# Patient Record
Sex: MALE | Race: WHITE | URBAN - METROPOLITAN AREA
[De-identification: names, ages, dates, MRNs, and addresses within clinical notes are randomized per-mention and may not be internally consistent; named-entity substitution may affect disease eponyms.]

---

## 2018-03-26 ENCOUNTER — TRANSFERRED RECORDS (OUTPATIENT)
Dept: HEALTH INFORMATION MANAGEMENT | Facility: CLINIC | Age: 78
End: 2018-03-26

## 2018-04-12 ENCOUNTER — HOSPITAL ENCOUNTER (OUTPATIENT)
Dept: CARDIAC REHAB | Facility: CLINIC | Age: 78
Setting detail: THERAPIES SERIES
End: 2018-04-12
Attending: INTERNAL MEDICINE
Payer: MEDICARE

## 2018-04-12 PROCEDURE — 40000244 ZZH STATISTIC VISIT PULM REHAB: Performed by: CLINICAL EXERCISE PHYSIOLOGIST

## 2018-04-12 PROCEDURE — G0424 PULMONARY REHAB W EXER: HCPCS | Performed by: CLINICAL EXERCISE PHYSIOLOGIST

## 2018-04-17 ENCOUNTER — HOSPITAL ENCOUNTER (OUTPATIENT)
Dept: CARDIAC REHAB | Facility: CLINIC | Age: 78
End: 2018-04-17
Attending: INTERNAL MEDICINE
Payer: MEDICARE

## 2018-04-17 PROCEDURE — 40000244 ZZH STATISTIC VISIT PULM REHAB: Performed by: CLINICAL EXERCISE PHYSIOLOGIST

## 2018-04-17 PROCEDURE — G0424 PULMONARY REHAB W EXER: HCPCS | Performed by: CLINICAL EXERCISE PHYSIOLOGIST

## 2018-04-19 ENCOUNTER — HOSPITAL ENCOUNTER (OUTPATIENT)
Dept: CARDIAC REHAB | Facility: CLINIC | Age: 78
End: 2018-04-19
Attending: INTERNAL MEDICINE
Payer: MEDICARE

## 2018-04-19 PROCEDURE — G0424 PULMONARY REHAB W EXER: HCPCS

## 2018-04-19 PROCEDURE — 40000244 ZZH STATISTIC VISIT PULM REHAB

## 2018-04-24 ENCOUNTER — HOSPITAL ENCOUNTER (OUTPATIENT)
Dept: CARDIAC REHAB | Facility: CLINIC | Age: 78
End: 2018-04-24
Attending: INTERNAL MEDICINE
Payer: MEDICARE

## 2018-04-24 PROCEDURE — G0424 PULMONARY REHAB W EXER: HCPCS | Performed by: CLINICAL EXERCISE PHYSIOLOGIST

## 2018-04-24 PROCEDURE — 40000244 ZZH STATISTIC VISIT PULM REHAB: Performed by: CLINICAL EXERCISE PHYSIOLOGIST

## 2018-04-27 ENCOUNTER — HOSPITAL ENCOUNTER (OUTPATIENT)
Dept: CARDIAC REHAB | Facility: CLINIC | Age: 78
End: 2018-04-27
Attending: INTERNAL MEDICINE
Payer: MEDICARE

## 2018-04-27 PROCEDURE — 40000116 ZZH STATISTIC OP CR VISIT: Performed by: CLINICAL EXERCISE PHYSIOLOGIST

## 2018-04-27 PROCEDURE — 40000244 ZZH STATISTIC VISIT PULM REHAB: Performed by: CLINICAL EXERCISE PHYSIOLOGIST

## 2018-04-27 PROCEDURE — G0424 PULMONARY REHAB W EXER: HCPCS | Performed by: CLINICAL EXERCISE PHYSIOLOGIST

## 2018-04-27 PROCEDURE — 93798 PHYS/QHP OP CAR RHAB W/ECG: CPT | Performed by: CLINICAL EXERCISE PHYSIOLOGIST

## 2018-05-01 ENCOUNTER — HOSPITAL ENCOUNTER (OUTPATIENT)
Dept: CARDIAC REHAB | Facility: CLINIC | Age: 78
End: 2018-05-01
Attending: INTERNAL MEDICINE
Payer: MEDICARE

## 2018-05-01 PROCEDURE — 40000244 ZZH STATISTIC VISIT PULM REHAB: Performed by: CLINICAL EXERCISE PHYSIOLOGIST

## 2018-05-01 PROCEDURE — G0424 PULMONARY REHAB W EXER: HCPCS | Performed by: CLINICAL EXERCISE PHYSIOLOGIST

## 2018-05-03 ENCOUNTER — HOSPITAL ENCOUNTER (OUTPATIENT)
Dept: CARDIAC REHAB | Facility: CLINIC | Age: 78
End: 2018-05-03
Attending: INTERNAL MEDICINE
Payer: MEDICARE

## 2018-05-03 PROCEDURE — 40000244 ZZH STATISTIC VISIT PULM REHAB: Performed by: CLINICAL EXERCISE PHYSIOLOGIST

## 2018-05-03 PROCEDURE — G0424 PULMONARY REHAB W EXER: HCPCS | Performed by: CLINICAL EXERCISE PHYSIOLOGIST

## 2018-05-10 ENCOUNTER — HOSPITAL ENCOUNTER (OUTPATIENT)
Dept: CARDIAC REHAB | Facility: CLINIC | Age: 78
End: 2018-05-10
Attending: INTERNAL MEDICINE
Payer: MEDICARE

## 2018-05-10 PROCEDURE — 40000244 ZZH STATISTIC VISIT PULM REHAB: Performed by: CLINICAL EXERCISE PHYSIOLOGIST

## 2018-05-10 PROCEDURE — G0424 PULMONARY REHAB W EXER: HCPCS | Performed by: CLINICAL EXERCISE PHYSIOLOGIST

## 2018-05-17 ENCOUNTER — HOSPITAL ENCOUNTER (OUTPATIENT)
Dept: CARDIAC REHAB | Facility: CLINIC | Age: 78
End: 2018-05-17
Attending: INTERNAL MEDICINE
Payer: MEDICARE

## 2018-05-17 PROCEDURE — G0424 PULMONARY REHAB W EXER: HCPCS

## 2018-05-17 PROCEDURE — 40000244 ZZH STATISTIC VISIT PULM REHAB

## 2018-05-29 ENCOUNTER — HOSPITAL ENCOUNTER (OUTPATIENT)
Dept: CARDIAC REHAB | Facility: CLINIC | Age: 78
End: 2018-05-29
Attending: INTERNAL MEDICINE
Payer: MEDICARE

## 2018-05-29 PROCEDURE — 40000244 ZZH STATISTIC VISIT PULM REHAB

## 2018-05-29 PROCEDURE — G0424 PULMONARY REHAB W EXER: HCPCS

## 2018-05-31 ENCOUNTER — HOSPITAL ENCOUNTER (OUTPATIENT)
Dept: CARDIAC REHAB | Facility: CLINIC | Age: 78
End: 2018-05-31
Attending: INTERNAL MEDICINE
Payer: MEDICARE

## 2018-05-31 PROCEDURE — G0424 PULMONARY REHAB W EXER: HCPCS | Performed by: CLINICAL EXERCISE PHYSIOLOGIST

## 2018-05-31 PROCEDURE — 40000244 ZZH STATISTIC VISIT PULM REHAB: Performed by: CLINICAL EXERCISE PHYSIOLOGIST

## 2018-07-03 NOTE — PROGRESS NOTES
Pulmonary/Respiratory Therapy Rehabilitation Discharge Summary    Reason for discharge:    Change in medical status.    Progress towards goals:  Goals partially met.  Barriers to achieving goals:   illness..    Recommendation(s):    Continued therapy is recommended.  Rationale/Recommendations:  Patient would benefit from skilled therapy to improve quality of life. Pt discharged due to recent illness and compliance. Pt will need new order to resume rehab .

## 2018-07-03 NOTE — ADDENDUM NOTE
Encounter addended by: Samantha Fermin on: 7/3/2018  1:38 PM<BR>     Actions taken: Episode resolved

## 2018-07-03 NOTE — ADDENDUM NOTE
Encounter addended by: Samantha Fermin on: 7/3/2018  1:33 PM<BR>     Actions taken: Sign clinical note

## 2018-07-17 ENCOUNTER — HOSPITAL ENCOUNTER (OUTPATIENT)
Dept: CARDIAC REHAB | Facility: CLINIC | Age: 78
End: 2018-07-17
Attending: INTERNAL MEDICINE
Payer: MEDICARE

## 2018-07-17 PROCEDURE — 40000244 ZZH STATISTIC VISIT PULM REHAB: Performed by: CLINICAL EXERCISE PHYSIOLOGIST

## 2018-07-17 PROCEDURE — G0424 PULMONARY REHAB W EXER: HCPCS | Performed by: CLINICAL EXERCISE PHYSIOLOGIST

## 2018-07-19 ENCOUNTER — HOSPITAL ENCOUNTER (OUTPATIENT)
Dept: CARDIAC REHAB | Facility: CLINIC | Age: 78
End: 2018-07-19
Attending: INTERNAL MEDICINE
Payer: MEDICARE

## 2018-07-19 PROCEDURE — 40000244 ZZH STATISTIC VISIT PULM REHAB: Performed by: CLINICAL EXERCISE PHYSIOLOGIST

## 2018-07-19 PROCEDURE — G0424 PULMONARY REHAB W EXER: HCPCS | Performed by: CLINICAL EXERCISE PHYSIOLOGIST

## 2018-07-24 ENCOUNTER — HOSPITAL ENCOUNTER (OUTPATIENT)
Dept: CARDIAC REHAB | Facility: CLINIC | Age: 78
End: 2018-07-24
Attending: INTERNAL MEDICINE
Payer: MEDICARE

## 2018-07-24 PROCEDURE — 40000244 ZZH STATISTIC VISIT PULM REHAB

## 2018-07-24 PROCEDURE — G0424 PULMONARY REHAB W EXER: HCPCS | Mod: KX

## 2018-07-26 ENCOUNTER — HOSPITAL ENCOUNTER (OUTPATIENT)
Dept: CARDIAC REHAB | Facility: CLINIC | Age: 78
End: 2018-07-26
Attending: INTERNAL MEDICINE
Payer: MEDICARE

## 2018-07-26 PROCEDURE — G0424 PULMONARY REHAB W EXER: HCPCS | Mod: KX | Performed by: OCCUPATIONAL THERAPIST

## 2018-07-26 PROCEDURE — 40000244 ZZH STATISTIC VISIT PULM REHAB: Performed by: OCCUPATIONAL THERAPIST

## 2018-08-07 ENCOUNTER — RECORDS - HEALTHEAST (OUTPATIENT)
Dept: LAB | Facility: CLINIC | Age: 78
End: 2018-08-07

## 2018-08-07 LAB
ANION GAP SERPL CALCULATED.3IONS-SCNC: 6 MMOL/L (ref 5–18)
BASOPHILS # BLD AUTO: 0 THOU/UL (ref 0–0.2)
BASOPHILS NFR BLD AUTO: 0 % (ref 0–2)
BUN SERPL-MCNC: 12 MG/DL (ref 8–28)
CALCIUM SERPL-MCNC: 8.4 MG/DL (ref 8.5–10.5)
CHLORIDE BLD-SCNC: 100 MMOL/L (ref 98–107)
CO2 SERPL-SCNC: 35 MMOL/L (ref 22–31)
CREAT SERPL-MCNC: 0.6 MG/DL (ref 0.7–1.3)
EOSINOPHIL # BLD AUTO: 0.1 THOU/UL (ref 0–0.4)
EOSINOPHIL NFR BLD AUTO: 1 % (ref 0–6)
ERYTHROCYTE [DISTWIDTH] IN BLOOD BY AUTOMATED COUNT: 26.5 % (ref 11–14.5)
GFR SERPL CREATININE-BSD FRML MDRD: >60 ML/MIN/1.73M2
GLUCOSE BLD-MCNC: 69 MG/DL (ref 70–125)
HCT VFR BLD AUTO: 28.5 % (ref 40–54)
HGB BLD-MCNC: 8.2 G/DL (ref 14–18)
LYMPHOCYTES # BLD AUTO: 1.5 THOU/UL (ref 0.8–4.4)
LYMPHOCYTES NFR BLD AUTO: 18 % (ref 20–40)
MCH RBC QN AUTO: 22.5 PG (ref 27–34)
MCHC RBC AUTO-ENTMCNC: 28.8 G/DL (ref 32–36)
MCV RBC AUTO: 78 FL (ref 80–100)
MONOCYTES # BLD AUTO: 0.5 THOU/UL (ref 0–0.9)
MONOCYTES NFR BLD AUTO: 6 % (ref 2–10)
NEUTROPHILS # BLD AUTO: 6.2 THOU/UL (ref 2–7.7)
NEUTROPHILS NFR BLD AUTO: 75 % (ref 50–70)
PLATELET # BLD AUTO: 189 THOU/UL (ref 140–440)
PMV BLD AUTO: 9.4 FL (ref 8.5–12.5)
POTASSIUM BLD-SCNC: 3.7 MMOL/L (ref 3.5–5)
RBC # BLD AUTO: 3.65 MILL/UL (ref 4.4–6.2)
SODIUM SERPL-SCNC: 141 MMOL/L (ref 136–145)
WBC: 8.3 THOU/UL (ref 4–11)

## 2018-08-10 ENCOUNTER — RECORDS - HEALTHEAST (OUTPATIENT)
Dept: LAB | Facility: CLINIC | Age: 78
End: 2018-08-10

## 2018-08-10 LAB
ANION GAP SERPL CALCULATED.3IONS-SCNC: 9 MMOL/L (ref 5–18)
BASOPHILS # BLD AUTO: 0 THOU/UL (ref 0–0.2)
BASOPHILS NFR BLD AUTO: 0 % (ref 0–2)
BUN SERPL-MCNC: 18 MG/DL (ref 8–28)
CALCIUM SERPL-MCNC: 8.8 MG/DL (ref 8.5–10.5)
CHLORIDE BLD-SCNC: 97 MMOL/L (ref 98–107)
CO2 SERPL-SCNC: 32 MMOL/L (ref 22–31)
CREAT SERPL-MCNC: 0.66 MG/DL (ref 0.7–1.3)
EOSINOPHIL # BLD AUTO: 0.2 THOU/UL (ref 0–0.4)
EOSINOPHIL NFR BLD AUTO: 2 % (ref 0–6)
ERYTHROCYTE [DISTWIDTH] IN BLOOD BY AUTOMATED COUNT: 26.4 % (ref 11–14.5)
GFR SERPL CREATININE-BSD FRML MDRD: >60 ML/MIN/1.73M2
GLUCOSE BLD-MCNC: 84 MG/DL (ref 70–125)
HCT VFR BLD AUTO: 31 % (ref 40–54)
HGB BLD-MCNC: 9.3 G/DL (ref 14–18)
LYMPHOCYTES # BLD AUTO: 1 THOU/UL (ref 0.8–4.4)
LYMPHOCYTES NFR BLD AUTO: 10 % (ref 20–40)
MCH RBC QN AUTO: 23.5 PG (ref 27–34)
MCHC RBC AUTO-ENTMCNC: 30 G/DL (ref 32–36)
MCV RBC AUTO: 79 FL (ref 80–100)
MONOCYTES # BLD AUTO: 0.6 THOU/UL (ref 0–0.9)
MONOCYTES NFR BLD AUTO: 6 % (ref 2–10)
NEUTROPHILS # BLD AUTO: 8.4 THOU/UL (ref 2–7.7)
NEUTROPHILS NFR BLD AUTO: 83 % (ref 50–70)
OVALOCYTES: ABNORMAL
PLAT MORPH BLD: NORMAL
PLATELET # BLD AUTO: 260 THOU/UL (ref 140–440)
PMV BLD AUTO: 9.5 FL (ref 8.5–12.5)
POLYCHROMASIA BLD QL SMEAR: ABNORMAL
POTASSIUM BLD-SCNC: 3.5 MMOL/L (ref 3.5–5)
RBC # BLD AUTO: 3.95 MILL/UL (ref 4.4–6.2)
SCHISTOCYTES: ABNORMAL
SODIUM SERPL-SCNC: 138 MMOL/L (ref 136–145)
TOXIC GRANULATION: ABNORMAL
WBC: 10.2 THOU/UL (ref 4–11)

## 2018-08-13 ENCOUNTER — RECORDS - HEALTHEAST (OUTPATIENT)
Dept: LAB | Facility: CLINIC | Age: 78
End: 2018-08-13

## 2018-08-13 LAB
ANION GAP SERPL CALCULATED.3IONS-SCNC: 7 MMOL/L (ref 5–18)
BASOPHILS # BLD AUTO: 0 THOU/UL (ref 0–0.2)
BASOPHILS NFR BLD AUTO: 0 % (ref 0–2)
BUN SERPL-MCNC: 13 MG/DL (ref 8–28)
CALCIUM SERPL-MCNC: 8.9 MG/DL (ref 8.5–10.5)
CHLORIDE BLD-SCNC: 99 MMOL/L (ref 98–107)
CO2 SERPL-SCNC: 34 MMOL/L (ref 22–31)
CREAT SERPL-MCNC: 0.63 MG/DL (ref 0.7–1.3)
EOSINOPHIL # BLD AUTO: 0.1 THOU/UL (ref 0–0.4)
EOSINOPHIL NFR BLD AUTO: 2 % (ref 0–6)
ERYTHROCYTE [DISTWIDTH] IN BLOOD BY AUTOMATED COUNT: 25.2 % (ref 11–14.5)
GFR SERPL CREATININE-BSD FRML MDRD: >60 ML/MIN/1.73M2
GLUCOSE BLD-MCNC: 81 MG/DL (ref 70–125)
HCT VFR BLD AUTO: 29.7 % (ref 40–54)
HGB BLD-MCNC: 8.4 G/DL (ref 14–18)
LYMPHOCYTES # BLD AUTO: 1.2 THOU/UL (ref 0.8–4.4)
LYMPHOCYTES NFR BLD AUTO: 19 % (ref 20–40)
MCH RBC QN AUTO: 23 PG (ref 27–34)
MCHC RBC AUTO-ENTMCNC: 28.3 G/DL (ref 32–36)
MCV RBC AUTO: 81 FL (ref 80–100)
MONOCYTES # BLD AUTO: 0.4 THOU/UL (ref 0–0.9)
MONOCYTES NFR BLD AUTO: 7 % (ref 2–10)
NEUTROPHILS # BLD AUTO: 4.4 THOU/UL (ref 2–7.7)
NEUTROPHILS NFR BLD AUTO: 71 % (ref 50–70)
OVALOCYTES: ABNORMAL
PLAT MORPH BLD: NORMAL
PLATELET # BLD AUTO: 264 THOU/UL (ref 140–440)
PMV BLD AUTO: 9.6 FL (ref 8.5–12.5)
POTASSIUM BLD-SCNC: 4.2 MMOL/L (ref 3.5–5)
RBC # BLD AUTO: 3.66 MILL/UL (ref 4.4–6.2)
SCHISTOCYTES: ABNORMAL
SODIUM SERPL-SCNC: 140 MMOL/L (ref 136–145)
WBC: 6.2 THOU/UL (ref 4–11)

## 2018-08-17 ENCOUNTER — RECORDS - HEALTHEAST (OUTPATIENT)
Dept: LAB | Facility: CLINIC | Age: 78
End: 2018-08-17

## 2018-08-20 LAB — HGB BLD-MCNC: 10.7 G/DL (ref 14–18)

## 2018-08-22 ENCOUNTER — TRANSFERRED RECORDS (OUTPATIENT)
Dept: HEALTH INFORMATION MANAGEMENT | Facility: CLINIC | Age: 78
End: 2018-08-22

## 2018-10-26 NOTE — ADDENDUM NOTE
Encounter addended by: SpeakerJose on: 10/26/2018  1:51 PM<BR>     Actions taken: Episode resolved, Sign clinical note

## 2018-10-26 NOTE — PROGRESS NOTES
Pulmonary Discharge Summary    Reason for discharge:    Change in medical status.    Progress towards goals:  Goals not met.  Barriers to achieving goals:   limited tolerance for therapy.    Recommendation(s):    Continue home exercise program.